# Patient Record
(demographics unavailable — no encounter records)

---

## 2024-11-08 NOTE — ASSESSMENT
[FreeTextEntry1] : CPE: -will order routine lab work -medical history including surgical history, family history, and current medications reviewed and documented as above -Age appropriate screenings including but not limited to cancer screening, alcohol misuse (audit-c) and vision screening as documented above -patient counseled on healthy diet and lifestyle changes including increasing physical activity and eating a diet low in processed foods and high in fruits and vegetables -counseled patient on age-appropriate vaccinations and immunization record updated as above - will administer high dose flu today  HLD continue simvastatin 40mg daily will check lipid panel today

## 2024-11-08 NOTE — HISTORY OF PRESENT ILLNESS
[FreeTextEntry1] : Pt is here for CPE. [de-identified] : 64 y/o with pmhx of hld presents for CPE. Patient has been overall doing well. Patient has been under some stress due to work, he will be retiring soon.

## 2025-02-12 NOTE — PHYSICAL EXAM
[No Edema] : there was no peripheral edema [No Focal Deficits] : no focal deficits [Normal] : affect was normal and insight and judgment were intact [de-identified] : left knee tender to palpation with mild swelling, ROM intact

## 2025-02-12 NOTE — ASSESSMENT
[FreeTextEntry1] : Acute pain of left knee s/p fall recommend ice, rest, and elevation can try otc nsaids and/or tylenol as needed for pain will send for xray to r/o fracture

## 2025-02-12 NOTE — HISTORY OF PRESENT ILLNESS
[FreeTextEntry1] : knee pain after a fall [de-identified] : 66 y/o with pmhx of hld presents for knee pain after a fall. Patient slipped on water and landed on his left knee earlier today. He is having pain in the front of the knee and in his calf when he stretches his knee out. Denies any LOC or head trauma

## 2025-02-28 NOTE — HEALTH RISK ASSESSMENT
Lab Results   Component Value Date    HGBA1C 6.4 (H) 02/15/2025             [Yes] : Yes [Monthly or less (1 pt)] : Monthly or less (1 point) [1 or 2 (0 pts)] : 1 or 2 (0 points) [Never (0 pts)] : Never (0 points) [No] : In the past 12 months have you used drugs other than those required for medical reasons? No [0] : 2) Feeling down, depressed, or hopeless: Not at all (0) [PHQ-2 Negative - No further assessment needed] : PHQ-2 Negative - No further assessment needed [Never] : Never [Alone] : lives alone [Employed] : employed [Patient reported colonoscopy was normal] : Patient reported colonoscopy was normal [Fully functional (bathing, dressing, toileting, transferring, walking, feeding)] : Fully functional (bathing, dressing, toileting, transferring, walking, feeding) [Fully functional (using the telephone, shopping, preparing meals, housekeeping, doing laundry, using] : Fully functional and needs no help or supervision to perform IADLs (using the telephone, shopping, preparing meals, housekeeping, doing laundry, using transportation, managing medications and managing finances) [Audit-CScore] : 1 [[id number]] : 0 [Reports changes in hearing] : Reports no changes in hearing [Reports changes in vision] : Reports no changes in vision [ColonoscopyDate] : 10/20 [ColonoscopyComments] : due for repeat with Dr. Harper [FreeTextEntry2] :